# Patient Record
Sex: MALE | Race: WHITE
[De-identification: names, ages, dates, MRNs, and addresses within clinical notes are randomized per-mention and may not be internally consistent; named-entity substitution may affect disease eponyms.]

---

## 2019-08-20 ENCOUNTER — HOSPITAL ENCOUNTER (OUTPATIENT)
Dept: HOSPITAL 95 - ORSCSDS | Age: 74
Discharge: HOME | End: 2019-08-20
Attending: INTERNAL MEDICINE
Payer: MEDICARE

## 2019-08-20 VITALS — BODY MASS INDEX: 22.73 KG/M2 | HEIGHT: 69.02 IN | WEIGHT: 153.44 LBS

## 2019-08-20 DIAGNOSIS — K22.70: Primary | ICD-10-CM

## 2019-08-20 DIAGNOSIS — Z79.01: ICD-10-CM

## 2019-08-20 DIAGNOSIS — K31.7: ICD-10-CM

## 2019-08-20 DIAGNOSIS — K21.9: ICD-10-CM

## 2019-08-20 DIAGNOSIS — E78.5: ICD-10-CM

## 2019-08-20 DIAGNOSIS — I48.0: ICD-10-CM

## 2019-08-20 DIAGNOSIS — Z79.899: ICD-10-CM

## 2019-08-20 DIAGNOSIS — K44.9: ICD-10-CM

## 2019-08-20 PROCEDURE — 0DB58ZX EXCISION OF ESOPHAGUS, VIA NATURAL OR ARTIFICIAL OPENING ENDOSCOPIC, DIAGNOSTIC: ICD-10-PCS | Performed by: INTERNAL MEDICINE

## 2019-08-20 PROCEDURE — 0DB68ZX EXCISION OF STOMACH, VIA NATURAL OR ARTIFICIAL OPENING ENDOSCOPIC, DIAGNOSTIC: ICD-10-PCS | Performed by: INTERNAL MEDICINE

## 2019-12-06 ENCOUNTER — HOSPITAL ENCOUNTER (OUTPATIENT)
Dept: HOSPITAL 95 - ORSCMMR | Age: 74
Discharge: HOME | End: 2019-12-06
Attending: SURGERY
Payer: MEDICARE

## 2019-12-06 VITALS — HEIGHT: 69.02 IN | WEIGHT: 156.97 LBS | BODY MASS INDEX: 23.25 KG/M2

## 2019-12-06 DIAGNOSIS — K40.20: Primary | ICD-10-CM

## 2019-12-06 DIAGNOSIS — K21.9: ICD-10-CM

## 2019-12-06 DIAGNOSIS — Z79.01: ICD-10-CM

## 2019-12-06 DIAGNOSIS — Z79.899: ICD-10-CM

## 2019-12-06 DIAGNOSIS — I48.0: ICD-10-CM

## 2019-12-06 DIAGNOSIS — I10: ICD-10-CM

## 2019-12-06 PROCEDURE — C1781 MESH (IMPLANTABLE): HCPCS

## 2019-12-06 PROCEDURE — 8E0W4CZ ROBOTIC ASSISTED PROCEDURE OF TRUNK REGION, PERCUTANEOUS ENDOSCOPIC APPROACH: ICD-10-PCS | Performed by: SURGERY

## 2019-12-06 PROCEDURE — 0YUA4JZ SUPPLEMENT BILATERAL INGUINAL REGION WITH SYNTHETIC SUBSTITUTE, PERCUTANEOUS ENDOSCOPIC APPROACH: ICD-10-PCS | Performed by: SURGERY

## 2019-12-06 PROCEDURE — 49650 LAP ING HERNIA REPAIR INIT: CPT

## 2019-12-06 NOTE — NUR
History, Chart, Medications and Allergies reviewed before start of
procedure.Patient confirms NPO status and agrees with scheduled surgery.
Patient States Post-Procedure ride home has been arranged.
Lungs clear T/O to Auscultation.

## 2019-12-06 NOTE — NUR
DAY SURGERY RN | DISCHARGE
 
VSS. A/O. DENIES NAUSEA. TOLERATING PO FLUIDS AND FOOD. DISCHARGE INSTRUCTIONS
AND RX GIVEN TO PATIENT WITH FAMILY PRESENT. PAIN MEDS GIVEN PER ORDERS. NO
ISSUES. TAKEN IN WHEEL CHAIR TO FRONT ENTRANCE. WIFE IS RIDE HOME.

## 2022-10-12 ENCOUNTER — HOSPITAL ENCOUNTER (OUTPATIENT)
Dept: HOSPITAL 95 - ORSCSDS | Age: 77
Discharge: HOME | End: 2022-10-12
Attending: INTERNAL MEDICINE
Payer: COMMERCIAL

## 2022-10-12 VITALS — HEIGHT: 69 IN | WEIGHT: 157.63 LBS | BODY MASS INDEX: 23.35 KG/M2

## 2022-10-12 DIAGNOSIS — K22.70: Primary | ICD-10-CM

## 2022-10-12 DIAGNOSIS — R13.10: ICD-10-CM

## 2022-10-12 DIAGNOSIS — K44.9: ICD-10-CM

## 2022-10-12 DIAGNOSIS — I48.0: ICD-10-CM

## 2022-10-12 DIAGNOSIS — Z79.899: ICD-10-CM

## 2022-10-12 DIAGNOSIS — K21.9: ICD-10-CM

## 2022-10-12 DIAGNOSIS — I77.810: ICD-10-CM

## 2022-10-12 DIAGNOSIS — K31.7: ICD-10-CM

## 2022-10-12 PROCEDURE — 0DB58ZX EXCISION OF ESOPHAGUS, VIA NATURAL OR ARTIFICIAL OPENING ENDOSCOPIC, DIAGNOSTIC: ICD-10-PCS | Performed by: INTERNAL MEDICINE

## 2022-10-12 PROCEDURE — 0D758ZZ DILATION OF ESOPHAGUS, VIA NATURAL OR ARTIFICIAL OPENING ENDOSCOPIC: ICD-10-PCS | Performed by: INTERNAL MEDICINE

## 2022-10-12 PROCEDURE — 0DB68ZX EXCISION OF STOMACH, VIA NATURAL OR ARTIFICIAL OPENING ENDOSCOPIC, DIAGNOSTIC: ICD-10-PCS | Performed by: INTERNAL MEDICINE

## 2024-10-10 ENCOUNTER — HOSPITAL ENCOUNTER (OUTPATIENT)
Dept: HOSPITAL 95 - ORSCSDS | Age: 79
Discharge: HOME | End: 2024-10-10
Attending: SPECIALIST
Payer: COMMERCIAL

## 2024-10-10 VITALS — WEIGHT: 148.59 LBS | HEIGHT: 68 IN | BODY MASS INDEX: 22.52 KG/M2

## 2024-10-10 VITALS — SYSTOLIC BLOOD PRESSURE: 123 MMHG | DIASTOLIC BLOOD PRESSURE: 69 MMHG

## 2024-10-10 DIAGNOSIS — Z79.01: ICD-10-CM

## 2024-10-10 DIAGNOSIS — Z12.11: Primary | ICD-10-CM

## 2024-10-10 DIAGNOSIS — I48.91: ICD-10-CM

## 2024-10-10 DIAGNOSIS — G25.0: ICD-10-CM

## 2024-10-10 DIAGNOSIS — Z79.899: ICD-10-CM

## 2024-10-10 DIAGNOSIS — K22.70: ICD-10-CM

## 2024-10-10 DIAGNOSIS — K57.30: ICD-10-CM

## 2024-10-10 DIAGNOSIS — E78.5: ICD-10-CM

## 2024-10-10 DIAGNOSIS — D12.0: ICD-10-CM

## 2024-10-10 PROCEDURE — 0DBH8ZX EXCISION OF CECUM, VIA NATURAL OR ARTIFICIAL OPENING ENDOSCOPIC, DIAGNOSTIC: ICD-10-PCS | Performed by: SPECIALIST
